# Patient Record
Sex: FEMALE
[De-identification: names, ages, dates, MRNs, and addresses within clinical notes are randomized per-mention and may not be internally consistent; named-entity substitution may affect disease eponyms.]

---

## 2020-03-16 ENCOUNTER — HOSPITAL ENCOUNTER (EMERGENCY)
Dept: HOSPITAL 5 - ED | Age: 50
Discharge: HOME | End: 2020-03-16
Payer: SELF-PAY

## 2020-03-16 VITALS — SYSTOLIC BLOOD PRESSURE: 99 MMHG | DIASTOLIC BLOOD PRESSURE: 61 MMHG
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
## 2020-03-16 DIAGNOSIS — M54.5: ICD-10-CM
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
## 2020-03-16 DIAGNOSIS — Z79.1: ICD-10-CM

## 2020-03-16 DIAGNOSIS — S16.1XXA: Primary | ICD-10-CM

## 2020-03-16 DIAGNOSIS — Z79.899: ICD-10-CM

## 2020-03-16 PROCEDURE — 99282 EMERGENCY DEPT VISIT SF MDM: CPT

## 2020-03-16 NOTE — EMERGENCY DEPARTMENT REPORT
ED Fall HPI





- General


Chief Complaint: Fall


Stated Complaint: FELL/BACK PAIN


Time Seen by Provider: 03/16/20 12:53


Source: patient


Mode of arrival: Ambulatory


Limitations: No Limitations





- History of Present Illness


Initial Comments: 





This is a 49-year-old  female who presents to the emergency room with 

posterior neck and low back pain from a fall.  Patient states she had a ground-

level fall yesterday afternoon.  Patient states she originally felt fine until 

the day progressed and woke up this morning with worsening posterior neck pain 

and low back pain with movement.  Patient states she has worsening stiffness.  

Pain is aggravated by movement.  She denies urinary or bowel pattern, numbness 

or tingling, radiating pain, swelling, or bruising.


MD Complaint: fall


-: Last night


Fall From: standing


When Fall Occurred: 24 hours PTA


Fall Witnessed: yes, by family


Place Fall Occurred: home


Loss of Consciousness: none


Prolonged Down Time?: no


Symptoms Prior to Fall: none


Location: neck, back


Severity: moderate


Severity scale (0 -10): 7


Quality: aching


Context: tripped/slipped


Associated Symptoms: denies





- Related Data


                                  Previous Rx's











 Medication  Instructions  Recorded  Last Taken  Type


 


Mv-Mn/Iron/FA/Herbal/Digestive 1 each PO QDAY #30 tablet 12/31/13 Unknown Rx





[Prenatal One Tablet]    


 


Ibuprofen [Motrin 800 MG tab] 800 mg PO Q8HR PRN #20 tablet 03/16/20 Unknown Rx


 


Methocarbamol [Robaxin] 500 mg PO BID PRN #15 tablet 03/16/20 Unknown Rx











                                    Allergies











Allergy/AdvReac Type Severity Reaction Status Date / Time


 


No Known Allergies Allergy   Unverified 12/30/13 15:29














ED Review of Systems


ROS: 


Stated complaint: FELL/BACK PAIN


Other details as noted in HPI





Constitutional: denies: chills, fever


Respiratory: denies: cough, shortness of breath, wheezing


Cardiovascular: denies: chest pain, palpitations


Gastrointestinal: denies: abdominal pain, nausea, diarrhea


Musculoskeletal: back pain, arthralgia (Posterior neck pain).  denies: joint 

swelling


Skin: denies: rash, lesions


Neurological: denies: headache, weakness, paresthesias


Psychiatric: denies: anxiety, depression





ED Past Medical Hx





- Past Medical History


Previous Medical History?: No


Additional medical history: hypothyroid





- Surgical History


Past Surgical History?: No


Additional Surgical History: Thyroid nodule removed.





- Social History


Smoking Status: Never Smoker


Substance Use Type: None





- Medications


Home Medications: 


                                Home Medications











 Medication  Instructions  Recorded  Confirmed  Last Taken  Type


 


Mv-Mn/Iron/FA/Herbal/Digestive 1 each PO QDAY #30 tablet 12/31/13  Unknown Rx





[Prenatal One Tablet]     


 


Ibuprofen [Motrin 800 MG tab] 800 mg PO Q8HR PRN #20 tablet 03/16/20  Unknown Rx


 


Methocarbamol [Robaxin] 500 mg PO BID PRN #15 tablet 03/16/20  Unknown Rx














ED Physical Exam





- General


Limitations: No Limitations


General appearance: alert, in no apparent distress





- Neck


Neck exam: Present: tenderness (Bilateral trapezius tenderness, no midline 

tenderness, no step-off or deformity), full ROM.  Absent: meningismus, 

lymphadenopathy, thyromegaly





- Respiratory


Respiratory exam: Present: normal lung sounds bilaterally.  Absent: respiratory 

distress





- Cardiovascular


Cardiovascular Exam: Present: regular rate, normal rhythm.  Absent: systolic 

murmur, diastolic murmur, rubs, gallop





- GI/Abdominal


GI/Abdominal exam: Present: soft, normal bowel sounds.  Absent: distended, 

tenderness, guarding, rebound, rigid





- Extremities Exam


Extremities exam: Present: normal inspection





- Back Exam


Back exam: Present: full ROM, paraspinal tenderness (Bilateral L-spine TTP, no 

midline tenderness, no deformity or step-off).  Absent: muscle spasm, vertebral 

tenderness, rash noted





- Neurological Exam


Neurological exam: Present: alert, oriented X3, normal gait





- Psychiatric


Psychiatric exam: Present: normal affect, normal mood





- Skin


Skin exam: Present: warm, dry, intact, normal color.  Absent: rash





ED Course


                                   Vital Signs











  03/16/20





  10:12


 


Temperature 98.1 F


 


Pulse Rate 96 H


 


Respiratory 20





Rate 


 


Blood Pressure 99/61


 


O2 Sat by Pulse 99





Oximetry 














ED Medical Decision Making





- Medical Decision Making





This


Critical care attestation.: 


If time is entered above; I have spent that time in minutes in the direct care 

of this critically ill patient, excluding procedure time.








ED Disposition


Clinical Impression: 


 Neck pain, Muscle strain





Low back pain


Qualifiers:


 Chronicity: acute Back pain laterality: bilateral Sciatica presence: without 

sciatica Qualified Code(s): M54.5 - Low back pain





Disposition: DC-01 TO HOME OR SELFCARE


Is pt being admited?: No


Condition: Stable


Instructions:  Cervical Spine Strain (ED), Low Back Strain (ED)


Additional Instructions: 


Rest


Use ice or heat on affected area for 20 minutes and off for 2 hours.


Take pain medication as needed for pain.


Don't drive or operate heavy machinery while taking muscle relaxers because they

 may cause drowsiness.


Follow up with Primary Care Provider in 2-3 days.


Prescriptions: 


Ibuprofen [Motrin 800 MG tab] 800 mg PO Q8HR PRN #20 tablet


 PRN Reason: Pain , Severe (7-10)


Methocarbamol [Robaxin] 500 mg PO BID PRN #15 tablet


 PRN Reason: Muscle Spasm


Referrals: 


[facility] [Outside] - 3-5 Days


[facility] [Outside] - 3-5 Days


[facility] [Outside] - 3-5 Days


Forms:  Work/School Release Form(ED)


Time of Disposition: 13:55

## 2020-09-14 ENCOUNTER — HOSPITAL ENCOUNTER (EMERGENCY)
Dept: HOSPITAL 5 - ED | Age: 50
Discharge: LEFT BEFORE DISCHARGE | End: 2020-09-14
Payer: SELF-PAY

## 2020-09-14 VITALS — DIASTOLIC BLOOD PRESSURE: 67 MMHG | SYSTOLIC BLOOD PRESSURE: 106 MMHG

## 2020-09-14 DIAGNOSIS — Z79.899: ICD-10-CM
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
## 2020-09-14 DIAGNOSIS — E05.00: ICD-10-CM
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
## 2020-09-14 DIAGNOSIS — S80.02XA: Primary | ICD-10-CM
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
## 2020-09-14 PROCEDURE — 99282 EMERGENCY DEPT VISIT SF MDM: CPT

## 2020-09-14 PROCEDURE — 72100 X-RAY EXAM L-S SPINE 2/3 VWS: CPT

## 2020-09-14 NOTE — EMERGENCY DEPARTMENT REPORT
ED Back Pain/Injury HPI





- General


Chief Complaint: Back Pain/Injury


Stated Complaint: SLIP AND FELL


Time Seen by Provider: 09/14/20 18:16


Source: patient


Limitations: No Limitations





- History of Present Illness


Initial Comments: 





50-year-old  female presents to the emergency room stating she slipped 

and fell at [employer].  Patient is does not know what public she had injured 

herself at.  Patient states that she was having back pain and knee pain.  

Patient reports that she had talked to  Figuero[employer] Aldana's office and they 

told her to come to the emergency room.  Patient reports she left the [employer] 

went home and then called EMS after she spoke to their  to come and be 

evaluated.  Patient states that she has a history of back pain and is currently 

in treatment with a chiropractor.  Patient reports that her knees hurt from the 

end pack.  Patient reports her last menstrual period was last month.  She has no

known drug allergies.  She has been taking ibuprofen for back pain.


-: This afternoon


Similar Symptoms Previously: Yes


Place: other ([employer] unknown location)


Radiation: none


Severity scale (0 -10): 7


Quality: aching


Consistency: intermittent


Improves With: none


Worsens With: walking


Associated Symptoms: denies other symptoms.  denies: confusion, weakness, chest 

pain, cough, incontinence





- Related Data


                                  Previous Rx's











 Medication  Instructions  Recorded  Last Taken  Type


 


Mv-Mn/Iron/FA/Herbal/Digestive 1 each PO QDAY #30 tablet 12/31/13 Unknown Rx





[Prenatal One Tablet]    


 


Ibuprofen [Motrin 800 MG tab] 800 mg PO Q8HR PRN #20 tablet 03/16/20 Unknown Rx


 


Methocarbamol [Robaxin] 500 mg PO BID PRN #15 tablet 03/16/20 Unknown Rx











                                    Allergies











Allergy/AdvReac Type Severity Reaction Status Date / Time


 


No Known Allergies Allergy   Unverified 12/30/13 15:29














ED Review of Systems


ROS: 


Stated complaint: SLIP AND FELL


Other details as noted in HPI





Comment: All other systems reviewed and negative





ED Past Medical Hx





- Past Medical History


Previous Medical History?: Yes


Additional medical history: hypothyroidism.  chronic back pain





- Surgical History


Additional Surgical History: Thyroid nodule removed.





- Social History


Smoking Status: Never Smoker


Substance Use Type: None





- Medications


Home Medications: 


                                Home Medications











 Medication  Instructions  Recorded  Confirmed  Last Taken  Type


 


Mv-Mn/Iron/FA/Herbal/Digestive 1 each PO QDAY #30 tablet 12/31/13  Unknown Rx





[Prenatal One Tablet]     


 


Ibuprofen [Motrin 800 MG tab] 800 mg PO Q8HR PRN #20 tablet 03/16/20  Unknown Rx


 


Methocarbamol [Robaxin] 500 mg PO BID PRN #15 tablet 03/16/20  Unknown Rx














ED Physical Exam





- General


Limitations: No Limitations


General appearance: alert, in no apparent distress





- Head


Head exam: Present: atraumatic, normocephalic





- Eye


Eye exam: Present: normal appearance





- ENT


ENT exam: Present: mucous membranes moist





- Neck


Neck exam: Present: normal inspection





- Expanded Lower Extremity Exam


  ** Left


Hip exam: Present: normal inspection


Upper Leg exam: Present: normal inspection


Knee exam: Present: full ROM, tenderness.  Absent: swelling


Lower Leg exam: Present: normal inspection, full ROM.  Absent: tenderness, 

swelling


Ankle exam: Present: normal inspection


Foot/Toe exam: Present: normal inspection


Neuro vascular tendon exam: Present: no vascular compromise





  ** Right


Hip exam: Present: normal inspection


Upper Leg exam: Present: normal inspection, full ROM


Knee exam: Present: full ROM.  Absent: tenderness, swelling


Lower Leg exam: Present: normal inspection, full ROM.  Absent: tenderness


Ankle exam: Present: normal inspection, full ROM.  Absent: tenderness


Neuro vascular tendon exam: Present: no vascular compromise





- Back Exam


Back exam: Present: full ROM, paraspinal tenderness





- Neurological Exam


Neurological exam: Present: alert, oriented X3





- Psychiatric


Psychiatric exam: Present: normal affect, normal mood





- Skin


Skin exam: Present: warm, dry, intact, normal color.  Absent: rash





ED Course


                                   Vital Signs











  09/14/20





  18:20


 


Temperature 97.8 F


 


Pulse Rate 81


 


Respiratory 16





Rate 


 


Blood Pressure 106/67





[Right] 


 


O2 Sat by Pulse 98





Oximetry 














ED Medical Decision Making





- Medical Decision Making





50-year-old  female presents to the emergency room stating she slipped 

and fell at [facility].  Patient is does not know what public she had injured 

herself at.  Patient states that she was having back pain and knee pain.  

Patient reports that she had talked to  [employer]uero[employer] Aldana's office and they 

told her to come to the emergency room.  [facility] 

went home and then called EMS after she spoke to their  to come and be 

evaluated.  Patient states that she has a history of back pain and is currently 

in treatment with a chiropractor.  Patient reports that her knees hurt from the 

end pack.  Patient reports her last menstrual period was last month.  She has no

 known drug allergies.  She has been taking ibuprofen for back pain.  X-ray of 

back shows no acute abnormalities.  Patient has full range of motion of her 

lower extremities and upper extremities.  Patient is ambulatory without 

difficulties.  Patient has no swelling appreciated.  Patient continue with her 

ibuprofen and to follow-up with her primary care doctor or her chiropractor.








It was reported to me that patient eloped as she had to get to her ride.


Critical care attestation.: 


If time is entered above; I have spent that time in minutes in the direct care 

of this critically ill patient, excluding procedure time.








ED Disposition


Clinical Impression: 


 Fall from slipping





Contusion, knee


Qualifiers:


 Encounter type: initial encounter Laterality: left Qualified Code(s): S80.02XA 

- Contusion of left knee, initial encounter





Disposition: Z-07 ELOPED


Is pt being admited?: No


Does the pt Need Aspirin: No


Condition: Stable


Additional Instructions: 


Instructed patient to take ibuprofen or Tylenol for pain management.  She can 

follow-up with her primary care provider or her orthopedic/chiropractor 

provider.

## 2020-09-14 NOTE — XRAY REPORT
LUMBAR SPINE 2 VIEWS



INDICATION:

pain s/p fall



COMPARISON:

None.



FINDINGS:

There is no fracture, subluxation, or other acute radiographic abnormality of the lumbar spine.



Signer Name: Bhavin Rashid MD 

Signed: 9/14/2020 8:17 PM

Workstation Name: [id number]

## 2020-09-14 NOTE — EMERGENCY DEPARTMENT REPORT
Blank Doc





- Documentation


Documentation: 





50-year-old female that presents with lower back pains s/p fall.





This initial assessment/diagnostic orders/clinical plan/treatment(s) is/are 

subject to change based on patient's health status, clinical progression and re-

assessment by fellow clinical providers in the ED.  Further treatment and workup

at subsequent clinical providers discretion.  Patient/guardians urged not to 

elope from the ED as their condition may be serious if not clinically assessed 

and managed.  Initial orders include:


1- Patient sent to [facility] for further evaluation and treatment


2- xrays